# Patient Record
Sex: MALE | Race: WHITE | NOT HISPANIC OR LATINO | Employment: OTHER | URBAN - METROPOLITAN AREA
[De-identification: names, ages, dates, MRNs, and addresses within clinical notes are randomized per-mention and may not be internally consistent; named-entity substitution may affect disease eponyms.]

---

## 2017-03-29 ENCOUNTER — PATIENT OUTREACH (OUTPATIENT)
Dept: HEALTH INFORMATION MANAGEMENT | Facility: OTHER | Age: 82
End: 2017-03-29

## 2017-03-30 NOTE — PROGRESS NOTES
Attempt #:1    Verify PCP: yes    Communication Preference Obtained: yes     Review Care Team: yes    Annual Wellness Visit Scheduling  1. Scheduling Status:Not Scheduled. Patient states they are not interested        Care Gap Scheduling (Attempt to Schedule EACH Overdue Care Gap!)     Health Maintenance Due   Topic Date Due   • IMM DTaP/Tdap/Td Vaccine (1 - Tdap) WILL DISCUSS WITH PCP   • IMM ZOSTER VACCINE  WILL DISCUSS WITH PCP   • IMM PNEUMOCOCCAL 65+ (ADULT) LOW/MEDIUM RISK SERIES (1 of 2 - PCV13) WILL DISCUSS WITH PCP   • Annual Wellness Visit  WILL DISCUSS WITH PCP         [employer] Activation: already active  [employer] Garret: no  Virtual Visits: no  Opt In to Text Messages: no

## 2017-04-20 ENCOUNTER — OFFICE VISIT (OUTPATIENT)
Dept: MEDICAL GROUP | Facility: CLINIC | Age: 82
End: 2017-04-20
Payer: MEDICARE

## 2017-04-20 VITALS
DIASTOLIC BLOOD PRESSURE: 70 MMHG | SYSTOLIC BLOOD PRESSURE: 160 MMHG | OXYGEN SATURATION: 97 % | HEART RATE: 60 BPM | HEIGHT: 72 IN | TEMPERATURE: 97.5 F | RESPIRATION RATE: 18 BRPM | WEIGHT: 159.7 LBS | BODY MASS INDEX: 21.63 KG/M2

## 2017-04-20 DIAGNOSIS — R53.81 DEBILITY: ICD-10-CM

## 2017-04-20 DIAGNOSIS — E56.9 VITAMIN DEFICIENCY: ICD-10-CM

## 2017-04-20 DIAGNOSIS — N40.0 BENIGN PROSTATIC HYPERPLASIA WITHOUT LOWER URINARY TRACT SYMPTOMS, UNSPECIFIED MORPHOLOGY: ICD-10-CM

## 2017-04-20 DIAGNOSIS — I10 ESSENTIAL HYPERTENSION: ICD-10-CM

## 2017-04-20 PROCEDURE — G8420 CALC BMI NORM PARAMETERS: HCPCS | Performed by: INTERNAL MEDICINE

## 2017-04-20 PROCEDURE — 1036F TOBACCO NON-USER: CPT | Performed by: INTERNAL MEDICINE

## 2017-04-20 PROCEDURE — 1101F PT FALLS ASSESS-DOCD LE1/YR: CPT | Performed by: INTERNAL MEDICINE

## 2017-04-20 PROCEDURE — 99214 OFFICE O/P EST MOD 30 MIN: CPT | Performed by: INTERNAL MEDICINE

## 2017-04-20 PROCEDURE — 4040F PNEUMOC VAC/ADMIN/RCVD: CPT | Mod: 8P | Performed by: INTERNAL MEDICINE

## 2017-04-20 PROCEDURE — G8432 DEP SCR NOT DOC, RNG: HCPCS | Performed by: INTERNAL MEDICINE

## 2017-04-20 RX ORDER — LISINOPRIL 40 MG/1
40 TABLET ORAL 2 TIMES DAILY
Qty: 60 TAB | Refills: 11 | Status: SHIPPED | OUTPATIENT
Start: 2017-04-20

## 2017-04-20 RX ORDER — TERAZOSIN 10 MG/1
10 CAPSULE ORAL DAILY
Qty: 30 CAP | Refills: 11 | Status: SHIPPED | OUTPATIENT
Start: 2017-04-20

## 2017-04-20 RX ORDER — AMLODIPINE BESYLATE 5 MG/1
5 TABLET ORAL DAILY
Qty: 30 TAB | Refills: 11 | Status: SHIPPED | OUTPATIENT
Start: 2017-04-20

## 2017-04-20 NOTE — MR AVS SNAPSHOT
"        Sivakumar Castro   2017 10:40 AM   Office Visit   MRN: [id number]    Department:  [facility]   Dept Phone:  [phone]    Description:  Male : 1922   Provider:  Will Norwood D.O.           Reason for Visit     Follow-Up labwork, Blood pressure, weight loss       Allergies as of 2017     No Known Allergies      You were diagnosed with     Essential hypertension   [8323664]       Benign prostatic hyperplasia without lower urinary tract symptoms, unspecified morphology   [2497130]       Vitamin deficiency   [429492]       Debility   [636638]         Vital Signs     Blood Pressure Pulse Temperature Respirations Height Weight    160/70 mmHg 60 36.4 °C (97.5 °F) 18 1.829 m (6' 0.01\") 72.439 kg (159 lb 11.2 oz)    Body Mass Index Oxygen Saturation Smoking Status             21.65 kg/m2 97% Former Smoker         Basic Information     Date Of Birth Sex Race Ethnicity Preferred Language    1922 Male White Non- English      Problem List              ICD-10-CM Priority Class Noted - Resolved    HTN (hypertension), benign (Chronic) I10   3/9/2010 - Present    BPH (benign prostatic hyperplasia) (Chronic) N40.0   3/9/2010 - Present    Pulmonary fibrosis (CMS-HCC) J84.10 High  2010 - Present    Anemia D64.9   2012 - Present    MEDICAL HOME    10/24/2012 - Present    Atherosclerosis of aorta (CMS-HCC) I70.0 High  2015 - Present    Thrombocytopenia (CMS-HCC) D69.6 High  2015 - Present      Health Maintenance        Date Due Completion Dates    IMM DTaP/Tdap/Td Vaccine (1 - Tdap) 1941 ---    IMM ZOSTER VACCINE 1982 ---    IMM PNEUMOCOCCAL 65+ (ADULT) LOW/MEDIUM RISK SERIES (1 of 2 - PCV13) 1987 ---            Current Immunizations     Influenza TIV (IM) 11/15/2013    Influenza Vaccine Adult HD 2016 10:15 AM, 11/3/2015  9:25 AM, 10/21/2014      Below and/or attached are the medications your provider expects you to take. Review all of your " home medications and newly ordered medications with your provider and/or pharmacist. Follow medication instructions as directed by your provider and/or pharmacist. Please keep your medication list with you and share with your provider. Update the information when medications are discontinued, doses are changed, or new medications (including over-the-counter products) are added; and carry medication information at all times in the event of emergency situations     Allergies:  No Known Allergies          Medications  Valid as of: April 20, 2017 - 12:09 PM    Generic Name Brand Name Tablet Size Instructions for use    AmLODIPine Besylate (Tab) NORVASC 5 MG Take 1 Tab by mouth every day.        Cholecalciferol (Cap) Cholecalciferol 1000 UNIT Take  by mouth.        Chondroitin Sulfate A   Take  by mouth.        Cyanocobalamin (Tab) VITAMIN B-12 100 MCG Take 100 mcg by mouth every day.        Lisinopril (Tab) PRINIVIL, ZESTRIL 40 MG Take 1 Tab by mouth 2 times a day.        Misc. Devices (Misc) Misc. Devices  Four wheel walker with chair. Use as needed.     prosthetics.        Saw [place]   by Does not apply route every day.        Terazosin HCl (Cap) HYTRIN 10 MG Take 1 Cap by mouth every day.        .                 Medicines prescribed today were sent to:     [facility] [address] [place] [address] (S), [place] [address] [facility] (S) [place] [address]    Phone: [phone] Fax: [phone]    Open 24 Hours?: No      Medication refill instructions:       If your prescription bottle indicates you have medication refills left, it is not necessary to call your provider’s office. Please contact your pharmacy and they will refill your medication.    If your prescription bottle indicates you do not have any refills left, you may request refills at any time through one of the following ways: The online [facility] system (except Urgent Care), by calling your provider’s office, or by asking your pharmacy to contact  your provider’s office with a refill request. Medication refills are processed only during regular business hours and may not be available until the next business day. Your provider may request additional information or to have a follow-up visit with you prior to refilling your medication.   *Please Note: Medication refills are assigned a new Rx number when refilled electronically. Your pharmacy may indicate that no refills were authorized even though a new prescription for the same medication is available at the pharmacy. Please request the medicine by name with the pharmacy before contacting your provider for a refill.        Your To Do List     Future Labs/Procedures Complete By Expires    CBC WITH DIFFERENTIAL  As directed 4/21/2018    COMP METABOLIC PANEL  As directed 4/21/2018    TSH  As directed 4/21/2018    VITAMIN D,25 HYDROXY  As directed 4/21/2018      Other Notes About Your Plan     Patient Enrolled In [facility] with .           [place] Status: Patient Declined

## 2017-04-21 NOTE — PROGRESS NOTES
CC: Sivakumar Castro is a 89 y.o. male is suffering from   Chief Complaint   Patient presents with   • Follow-Up     labwork, Blood pressure, weight loss          SUBJECTIVE:  1. Essential hypertension  Beds here for follow-up, continues to live independently, suffering from hypertension, possible weight loss.     2. Benign prostatic hyperplasia without lower urinary tract symptoms, unspecified morphology  Patient and I have discussed that he needs refills of his medication of encouraged him to stay on them, he states he is doing well with his meds.     3. Vitamin deficiency  We will check patient's vitamin D    4. Debility  Referral written for wheelchair        Past social, family, history:   Social History   Substance Use Topics   • Smoking status: Former Smoker     Types: Cigarettes     Quit date: 04/20/1943   • Smokeless tobacco: Never Used   • Alcohol Use: Yes      Comment: rarely         MEDICATIONS:    Current outpatient prescriptions:   •  lisinopril (PRINIVIL, ZESTRIL) 40 MG tablet, Take 1 Tab by mouth 2 times a day., Disp: 60 Tab, Rfl: 11  •  terazosin (HYTRIN) 10 MG capsule, Take 1 Cap by mouth every day., Disp: 30 Cap, Rfl: 11  •  amlodipine (NORVASC) 5 MG Tab, Take 1 Tab by mouth every day., Disp: 30 Tab, Rfl: 11  •  Misc. Devices Misc, Four wheel walker with chair. Use as needed.   prosthetics., Disp: 1 Each, Rfl: 0  •  cyanocobalamin (VITAMIN B-12) 100 MCG TABS, Take 100 mcg by mouth every day., Disp: , Rfl:   •  CHONDROITIN SULFATE A PO, Take  by mouth., Disp: , Rfl:   •  SAW [place], by Does not apply route every day., Disp: , Rfl:   •  Cholecalciferol (VITAMIN D3) 1000 UNIT CAPS, Take  by mouth., Disp: , Rfl:     PROBLEMS:  Patient Active Problem List    Diagnosis Date Noted   • Atherosclerosis of aorta (CMS-HCC) 04/16/2015     Priority: High   • Thrombocytopenia (CMS-[facility]) 04/16/2015     Priority: High   • Pulmonary fibrosis (CMS-[facility]) 04/07/2010     Priority: High   • MEDICAL  "HOME 10/24/2012   • Anemia 04/19/2012   • HTN (hypertension), benign 03/09/2010   • BPH (benign prostatic hyperplasia) 03/09/2010       REVIEW OF SYSTEMS:  Gen.:  No Nausea, Vomiting, fever, Chills.  Heart: No chest pain.  Lungs:  No shortness of Breath.  Psychological: Michel unusual Anxiety depression     PHYSICAL EXAM   Constitutional: Alert, cooperative, not in acute distress.  Cardiovascular:  Rate Rhythm is regular without murmurs rubs clicks.     Thorax & Lungs: Clear to auscultation, no wheezing, rhonchi, or rales  HENT: Normocephalic, Atraumatic.  Eyes: PERRLA, EOMI, Conjunctiva normal.   Neck: Trachia is midline no swelling of the thyroid.   Lymphatic: No lymphadenopathy noted.   Neurologic: Alert & oriented x 3, cranial nerves II through XII are intact, Normal motor function, Normal sensory function, No focal deficits noted.   Psychiatric: Affect normal, Judgment normal, Mood normal.     VITAL SIGNS:/70 mmHg  Pulse 60  Temp(Src) 36.4 °C (97.5 °F)  Resp 18  Ht 1.829 m (6' 0.01\")  Wt 72.439 kg (159 lb 11.2 oz)  BMI 21.65 kg/m2  SpO2 97%    Labs: Reviewed    Assessment:                                                     Plan:    1. Essential hypertension  Continue current medical therapy.   - lisinopril (PRINIVIL, ZESTRIL) 40 MG tablet; Take 1 Tab by mouth 2 times a day.  Dispense: 60 Tab; Refill: 11  - amlodipine (NORVASC) 5 MG Tab; Take 1 Tab by mouth every day.  Dispense: 30 Tab; Refill: 11  - COMP METABOLIC PANEL; Future  - CBC WITH DIFFERENTIAL; Future  - TSH; Future    2. Benign prostatic hyperplasia without lower urinary tract symptoms, unspecified morphology  Medication refilled  - terazosin (HYTRIN) 10 MG capsule; Take 1 Cap by mouth every day.  Dispense: 30 Cap; Refill: 11    3. Vitamin deficiency  Check vitamin D  - VITAMIN D,25 HYDROXY; Future    4. Debility  Orders written for walker.  - Misc. Devices Misc; Four wheel walker with chair. Use as needed.     prosthetics.  " Dispense: 1 Each; Refill: 0

## 2017-04-26 ENCOUNTER — HOSPITAL ENCOUNTER (OUTPATIENT)
Dept: LAB | Facility: MEDICAL CENTER | Age: 82
End: 2017-04-26
Attending: INTERNAL MEDICINE
Payer: MEDICARE

## 2017-04-26 DIAGNOSIS — I10 ESSENTIAL HYPERTENSION: ICD-10-CM

## 2017-04-26 DIAGNOSIS — E56.9 VITAMIN DEFICIENCY: ICD-10-CM

## 2017-04-26 LAB
25(OH)D3 SERPL-MCNC: 27 NG/ML (ref 30–100)
ALBUMIN SERPL BCP-MCNC: 3.8 G/DL (ref 3.2–4.9)
ALBUMIN/GLOB SERPL: 1.4 G/DL
ALP SERPL-CCNC: 103 U/L (ref 30–99)
ALT SERPL-CCNC: 13 U/L (ref 2–50)
ANION GAP SERPL CALC-SCNC: 3 MMOL/L (ref 0–11.9)
AST SERPL-CCNC: 16 U/L (ref 12–45)
BASOPHILS # BLD AUTO: 0.8 % (ref 0–1.8)
BASOPHILS # BLD: 0.04 K/UL (ref 0–0.12)
BILIRUB SERPL-MCNC: 0.8 MG/DL (ref 0.1–1.5)
BUN SERPL-MCNC: 31 MG/DL (ref 8–22)
CALCIUM SERPL-MCNC: 9.6 MG/DL (ref 8.5–10.5)
CHLORIDE SERPL-SCNC: 107 MMOL/L (ref 96–112)
CO2 SERPL-SCNC: 28 MMOL/L (ref 20–33)
CREAT SERPL-MCNC: 0.95 MG/DL (ref 0.5–1.4)
EOSINOPHIL # BLD AUTO: 0.22 K/UL (ref 0–0.51)
EOSINOPHIL NFR BLD: 4.7 % (ref 0–6.9)
ERYTHROCYTE [DISTWIDTH] IN BLOOD BY AUTOMATED COUNT: 49.9 FL (ref 35.9–50)
GFR SERPL CREATININE-BSD FRML MDRD: >60 ML/MIN/1.73 M 2
GLOBULIN SER CALC-MCNC: 2.7 G/DL (ref 1.9–3.5)
GLUCOSE SERPL-MCNC: 90 MG/DL (ref 65–99)
HCT VFR BLD AUTO: 36.5 % (ref 42–52)
HGB BLD-MCNC: 12.2 G/DL (ref 14–18)
IMM GRANULOCYTES # BLD AUTO: 0.01 K/UL (ref 0–0.11)
IMM GRANULOCYTES NFR BLD AUTO: 0.2 % (ref 0–0.9)
LYMPHOCYTES # BLD AUTO: 0.96 K/UL (ref 1–4.8)
LYMPHOCYTES NFR BLD: 20.3 % (ref 22–41)
MCH RBC QN AUTO: 31.9 PG (ref 27–33)
MCHC RBC AUTO-ENTMCNC: 33.4 G/DL (ref 33.7–35.3)
MCV RBC AUTO: 95.3 FL (ref 81.4–97.8)
MONOCYTES # BLD AUTO: 0.37 K/UL (ref 0–0.85)
MONOCYTES NFR BLD AUTO: 7.8 % (ref 0–13.4)
NEUTROPHILS # BLD AUTO: 3.12 K/UL (ref 1.82–7.42)
NEUTROPHILS NFR BLD: 66.2 % (ref 44–72)
NRBC # BLD AUTO: 0 K/UL
NRBC BLD AUTO-RTO: 0 /100 WBC
PLATELET # BLD AUTO: 169 K/UL (ref 164–446)
PMV BLD AUTO: 10.5 FL (ref 9–12.9)
POTASSIUM SERPL-SCNC: 4.1 MMOL/L (ref 3.6–5.5)
PROT SERPL-MCNC: 6.5 G/DL (ref 6–8.2)
RBC # BLD AUTO: 3.83 M/UL (ref 4.7–6.1)
SODIUM SERPL-SCNC: 138 MMOL/L (ref 135–145)
TSH SERPL DL<=0.005 MIU/L-ACNC: 1.29 UIU/ML (ref 0.3–3.7)
WBC # BLD AUTO: 4.7 K/UL (ref 4.8–10.8)

## 2017-04-26 PROCEDURE — 82306 VITAMIN D 25 HYDROXY: CPT

## 2017-04-26 PROCEDURE — 85025 COMPLETE CBC W/AUTO DIFF WBC: CPT

## 2017-04-26 PROCEDURE — 36415 COLL VENOUS BLD VENIPUNCTURE: CPT

## 2017-04-26 PROCEDURE — 84443 ASSAY THYROID STIM HORMONE: CPT

## 2017-04-26 PROCEDURE — 80053 COMPREHEN METABOLIC PANEL: CPT

## 2017-11-22 ENCOUNTER — PATIENT OUTREACH (OUTPATIENT)
Dept: HEALTH INFORMATION MANAGEMENT | Facility: OTHER | Age: 82
End: 2017-11-22

## 2017-11-22 NOTE — PROGRESS NOTES
Outcome: Left Message    Please transfer to Patient Outreach Team at [phone] when patient returns call.    WebIZ Checked & Epic Updated:  yes    [facility] Verified: yes    Attempt # 1

## 2017-11-22 NOTE — PROGRESS NOTES
WebIZ Checked & Epic Updated: Yes  · WebIZ Recommendations: PREVNAR (PCV13) , TDAP and ZOSTAVAX (Shingles)  · Is patient due for Tdap? YES. Patient was not notified of copay/out of pocket cost.  · Is patient due for Shingles? YES. Patient was not notified of copay/out of pocket cost.  [facility] Verified: yes  Verify PCP: yes    Communication Preference Obtained: yes     Review Care Team: yes    Annual Wellness Visit Scheduling  1. Scheduling Status:Scheduled     Care Gap Scheduling (Attempt to Schedule EACH Overdue Care Gap!)     Health Maintenance Due   Topic Date Due   • IMM DTaP/Tdap/Td Vaccine (1 - Tdap) Pt prefers to discuss Immunizations with PCP.   • IMM ZOSTER VACCINE  04/19/1982   • IMM PNEUMOCOCCAL 65+ (ADULT) LOW/MEDIUM RISK SERIES (1 of 2 - PCV13) 04/19/1987   • Annual Wellness Visit  08/07/2014   • IMM INFLUENZA (1) Requested records to [facility] #[id number]        Scheduled patient for Annual Wellness Visit       MyChart Activation: declined

## 2017-12-11 ENCOUNTER — TELEPHONE (OUTPATIENT)
Dept: MEDICAL GROUP | Facility: CLINIC | Age: 82
End: 2017-12-11
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
## 2017-12-11 NOTE — TELEPHONE ENCOUNTER
VOICEMAIL  1. Caller Name: Dana at [facility],                   Call Back Number: [phone]    2. Message: Would like to know if Dr Norwood would sign the death certificate and what he would list as the cause of death. Please return phone call and list case#[id number]    3. Patient approves office to leave a detailed voicemail/MyChart message:N\A

## 2017-12-11 NOTE — TELEPHONE ENCOUNTER
1. Caller Name: Lilian Gray only daughter                       Call Back Number: [phone] home [phone]    2. Message: Lilian called to informed you that her dad passed on saturday at home, will like to speak to you regarding his passing. To discuss autopsy. Thank you better if you can call her back today at cell between hrs of 5-6 pm.     3. Patient approves office to leave a detailed voicemail/MyChart message: yes

## 2017-12-13 ENCOUNTER — TELEPHONE (OUTPATIENT)
Dept: MEDICAL GROUP | Facility: CLINIC | Age: 82
End: 2017-12-13